# Patient Record
Sex: FEMALE | Race: WHITE | Employment: OTHER
[De-identification: names, ages, dates, MRNs, and addresses within clinical notes are randomized per-mention and may not be internally consistent; named-entity substitution may affect disease eponyms.]

---

## 2017-07-15 ENCOUNTER — HEALTH MAINTENANCE LETTER (OUTPATIENT)
Age: 52
End: 2017-07-15

## 2017-12-04 ENCOUNTER — CARE COORDINATION (OUTPATIENT)
Dept: CARE COORDINATION | Facility: CLINIC | Age: 52
End: 2017-12-04

## 2017-12-04 NOTE — PROGRESS NOTES
Clinic Care Coordination Contact  [facility]/Voicemail    Date: 12/4/17  Referral Source: PCP  Clinical Data: Care Coordinator Outreach due to recent PCP appointment. Patient is experiencing increased symptoms of anxiety due to multiple life stressors.   Outreach attempted x 1.  Left message on voicemail with call back information and requested return call.  Plan: Care Coordinator will try to reach patient again in 3-5 business days.    Evelina Gonzáles [facility]   Care Coordinator   [phone]

## 2018-05-01 ENCOUNTER — PATIENT OUTREACH (OUTPATIENT)
Dept: CARE COORDINATION | Facility: CLINIC | Age: 53
End: 2018-05-01

## 2018-05-08 ENCOUNTER — PATIENT OUTREACH (OUTPATIENT)
Dept: CARE COORDINATION | Facility: CLINIC | Age: 53
End: 2018-05-08

## 2018-05-08 DIAGNOSIS — Z65.9 PSYCHOSOCIAL PROBLEM: Primary | ICD-10-CM

## 2018-05-08 NOTE — LETTER
[place] CARE COORDINATION    May 8, 2018    Kiah CHOWDARY Scott [facility]  [address]  [place] [address]      Dear Kiah,    I am a clinic care coordinator who works with Tonia Lua at [facility]. I wanted to thank you for spending the time to talk with me.  I wanted to provide you with my contact information so that you can call me with questions or concerns about your health care. Below is a description of clinic care coordination and how I can further assist you.     The clinic care coordinator is a registered nurse and/or  who understand the health care system. The goal of clinic care coordination is to help you manage your health and improve access to the [facility] system in the most efficient manner. The registered nurse can assist you in meeting your health care goals by providing education, coordinating services, and strengthening the communication among your providers. The  can assist you with financial, behavioral, psychosocial, chemical dependency, counseling, and/or psychiatric resources.    Please feel free to contact me at [phone], with any questions or concerns. I am glad that you now have insurance and are getting the help you need.      Sincerely,     Evelina Gonzáles, [facility]  Social Work Care Coordinator   [facility]  [phone]

## 2018-05-08 NOTE — PROGRESS NOTES
"Clinic Care Coordination Contact  [facility] [[email or web address]Voicemail       Clinical Data: Care Coordinator Outreach - Patient has upcoming appointment on 5/18. In the past barriers to care was that patient didn't have insurance.   Outreach attempted x 1.  Left message on voicemail with call back information and requested return call.  Plan:  Care Coordinator will try to reach patient again in 3-5 business days.    Evelina Gonzáles [facility]  Social Work Care Coordinator   [facility]  [phone]      2:00pm - Patient returned SW voicemail. Patient states she is doing better. She states her son contacted the [facility] on her and so she is now involved with a [facility] worker and is on Medical Assistance for insurance. She has good family support but continues to struggle with accepting the help from the [facility]. She states it has been \"humiliating\" but at the same time \"I don't want to end up 52 living under a bridge somewhere.\" Patient denies any current needs as she is working with her [facility] worker. Patient is agreeable to  FU in a month to check in.    Evelina Gonzáles [facility]  Social Work Care Coordinator   [facility]  [phone]                "

## 2018-06-22 ENCOUNTER — PATIENT OUTREACH (OUTPATIENT)
Dept: CARE COORDINATION | Facility: CLINIC | Age: 53
End: 2018-06-22

## 2018-06-22 NOTE — PROGRESS NOTES
Clinic Care Coordination Contact  [facility]/Voicemail    Referral Source: PCP  Clinical Data: Care Coordinator Outreach - to FU from prior conversation. If no needs at this time will close patient to CC services. She is connected to the [facility] for services and has MA for insurance.   Outreach attempted x 1.  Left message on voicemail with call back information and requested return call.  Plan: Care Coordinator will try to reach patient again in 3-5 business days.    Evelina Gonzáles [facility]  Social Work Care Coordinator   [facility]  [phone]

## 2018-07-22 ENCOUNTER — HEALTH MAINTENANCE LETTER (OUTPATIENT)
Age: 53
End: 2018-07-22

## 2018-11-13 NOTE — PROGRESS NOTES
Clinic Care Coordination Contact    Situation: Patient chart reviewed by care coordinator.    Background: SW outreach is overdue. SW was going to close patient at last encounter as patient has MA and is connected to the [facility] for services.    Assessment: Patient did not return last outreach. SW did not locate any current needs on chart review for CC outreach.    Plan/Recommendations: SW will do no further outreach at this time.    Evelina Gonzáles [facility]  Social Work Care Coordinator   [facility]  [phone]

## 2019-07-25 ENCOUNTER — APPOINTMENT (OUTPATIENT)
Dept: CT IMAGING | Facility: CLINIC | Age: 54
End: 2019-07-25
Attending: EMERGENCY MEDICINE

## 2019-07-25 ENCOUNTER — HOSPITAL ENCOUNTER (EMERGENCY)
Facility: CLINIC | Age: 54
Discharge: HOME | End: 2019-07-25
Attending: EMERGENCY MEDICINE | Admitting: EMERGENCY MEDICINE

## 2019-07-25 VITALS
HEART RATE: 87 BPM | RESPIRATION RATE: 16 BRPM | TEMPERATURE: 97.9 F | SYSTOLIC BLOOD PRESSURE: 135 MMHG | OXYGEN SATURATION: 100 % | DIASTOLIC BLOOD PRESSURE: 80 MMHG

## 2019-07-25 DIAGNOSIS — R10.84 ABDOMINAL PAIN, GENERALIZED: ICD-10-CM

## 2019-07-25 LAB
ALBUMIN SERPL-MCNC: 4.1 G/DL (ref 3.4–5)
ALBUMIN UR-MCNC: NEGATIVE MG/DL
ALP SERPL-CCNC: 90 U/L (ref 40–150)
ALT SERPL W P-5'-P-CCNC: 22 U/L (ref 0–50)
ANION GAP SERPL CALCULATED.3IONS-SCNC: 6 MMOL/L (ref 3–14)
APPEARANCE UR: CLEAR
AST SERPL W P-5'-P-CCNC: 12 U/L (ref 0–45)
BASOPHILS # BLD AUTO: 0 10E9/L (ref 0–0.2)
BASOPHILS NFR BLD AUTO: 0.6 %
BILIRUB SERPL-MCNC: 0.4 MG/DL (ref 0.2–1.3)
BILIRUB UR QL STRIP: NEGATIVE
BUN SERPL-MCNC: 16 MG/DL (ref 7–30)
CALCIUM SERPL-MCNC: 9.1 MG/DL (ref 8.5–10.1)
CHLORIDE SERPL-SCNC: 113 MMOL/L (ref 94–109)
CO2 SERPL-SCNC: 23 MMOL/L (ref 20–32)
COLOR UR AUTO: ABNORMAL
CREAT SERPL-MCNC: 0.79 MG/DL (ref 0.52–1.04)
DIFFERENTIAL METHOD BLD: NORMAL
EOSINOPHIL # BLD AUTO: 0.1 10E9/L (ref 0–0.7)
EOSINOPHIL NFR BLD AUTO: 1 %
ERYTHROCYTE [DISTWIDTH] IN BLOOD BY AUTOMATED COUNT: 12.7 % (ref 10–15)
GFR SERPL CREATININE-BSD FRML MDRD: 85 ML/MIN/{1.73_M2}
GLUCOSE SERPL-MCNC: 117 MG/DL (ref 70–99)
GLUCOSE UR STRIP-MCNC: NEGATIVE MG/DL
HCG UR QL: NEGATIVE
HCT VFR BLD AUTO: 43.2 % (ref 35–47)
HGB BLD-MCNC: 15.3 G/DL (ref 11.7–15.7)
HGB UR QL STRIP: ABNORMAL
IMM GRANULOCYTES # BLD: 0 10E9/L (ref 0–0.4)
IMM GRANULOCYTES NFR BLD: 0.1 %
KETONES UR STRIP-MCNC: NEGATIVE MG/DL
LEUKOCYTE ESTERASE UR QL STRIP: NEGATIVE
LYMPHOCYTES # BLD AUTO: 1.4 10E9/L (ref 0.8–5.3)
LYMPHOCYTES NFR BLD AUTO: 20 %
MCH RBC QN AUTO: 30.9 PG (ref 26.5–33)
MCHC RBC AUTO-ENTMCNC: 35.4 G/DL (ref 31.5–36.5)
MCV RBC AUTO: 87 FL (ref 78–100)
MONOCYTES # BLD AUTO: 0.4 10E9/L (ref 0–1.3)
MONOCYTES NFR BLD AUTO: 5.5 %
NEUTROPHILS # BLD AUTO: 5.2 10E9/L (ref 1.6–8.3)
NEUTROPHILS NFR BLD AUTO: 72.8 %
NITRATE UR QL: NEGATIVE
NRBC # BLD AUTO: 0 10*3/UL
NRBC BLD AUTO-RTO: 0 /100
PH UR STRIP: 5.5 PH (ref 5–7)
PLATELET # BLD AUTO: 245 10E9/L (ref 150–450)
POTASSIUM SERPL-SCNC: 3.6 MMOL/L (ref 3.4–5.3)
PROT SERPL-MCNC: 7.8 G/DL (ref 6.8–8.8)
RBC # BLD AUTO: 4.95 10E12/L (ref 3.8–5.2)
RBC #/AREA URNS AUTO: 1 /HPF (ref 0–2)
SODIUM SERPL-SCNC: 142 MMOL/L (ref 133–144)
SOURCE: ABNORMAL
SP GR UR STRIP: 1 (ref 1–1.03)
SQUAMOUS #/AREA URNS AUTO: <1 /HPF (ref 0–1)
UROBILINOGEN UR STRIP-MCNC: NORMAL MG/DL (ref 0–2)
WBC # BLD AUTO: 7.1 10E9/L (ref 4–11)
WBC #/AREA URNS AUTO: 0 /HPF (ref 0–5)

## 2019-07-25 PROCEDURE — 81001 URINALYSIS AUTO W/SCOPE: CPT | Performed by: EMERGENCY MEDICINE

## 2019-07-25 PROCEDURE — 85025 COMPLETE CBC W/AUTO DIFF WBC: CPT | Performed by: EMERGENCY MEDICINE

## 2019-07-25 PROCEDURE — 25000125 ZZHC RX 250: Performed by: EMERGENCY MEDICINE

## 2019-07-25 PROCEDURE — 99285 EMERGENCY DEPT VISIT HI MDM: CPT | Mod: 25

## 2019-07-25 PROCEDURE — 80053 COMPREHEN METABOLIC PANEL: CPT | Performed by: EMERGENCY MEDICINE

## 2019-07-25 PROCEDURE — 81025 URINE PREGNANCY TEST: CPT | Performed by: EMERGENCY MEDICINE

## 2019-07-25 PROCEDURE — 74177 CT ABD & PELVIS W/CONTRAST: CPT

## 2019-07-25 PROCEDURE — 25500064 ZZH RX 255 OP 636: Performed by: EMERGENCY MEDICINE

## 2019-07-25 RX ADMIN — SODIUM CHLORIDE 70 ML: 9 INJECTION, SOLUTION INTRAVENOUS at 08:09

## 2019-07-25 RX ADMIN — IOHEXOL 98 ML: 350 INJECTION, SOLUTION INTRAVENOUS at 08:09

## 2019-07-25 ASSESSMENT — ENCOUNTER SYMPTOMS
CHILLS: 1
ABDOMINAL PAIN: 1
COUGH: 0
DYSURIA: 0
DIAPHORESIS: 1
UNEXPECTED WEIGHT CHANGE: 1
ABDOMINAL DISTENTION: 1
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
## 2019-07-25 NOTE — ED AVS SNAPSHOT
Emergency Department  [address] [address]  Phone:  [phone]  Fax:  [phone]                                    Kiah Scott   MRN: [id number]    Department:   Emergency Department   Date of Visit:  7/25/2019           After Visit Summary Signature Page    I have received my discharge instructions, and my questions have been answered. I have discussed any challenges I see with this plan with the nurse or doctor.    ..........................................................................................................................................  Patient/Patient Representative Signature      ..........................................................................................................................................  Patient Representative Print Name and Relationship to Patient    ..................................................               ................................................  Date                                   Time    ..........................................................................................................................................  Reviewed by Signature/Title    ...................................................              ..............................................  Date                                               Time          22EPIC Rev 08/18
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
## 2019-11-03 ENCOUNTER — HEALTH MAINTENANCE LETTER (OUTPATIENT)
Age: 54
End: 2019-11-03

## 2019-11-27 ENCOUNTER — PATIENT OUTREACH (OUTPATIENT)
Dept: CARE COORDINATION | Facility: CLINIC | Age: 54
End: 2019-11-27

## 2019-11-27 DIAGNOSIS — G35 MS (MULTIPLE SCLEROSIS) (H): Primary | ICD-10-CM

## 2019-11-27 NOTE — PROGRESS NOTES
Contact  [facility]    Referral Source: Care Team  Clinical Data:  Outreach  Outreach attempted x 1.  Left message on voicemail with call back information and requested return call.  Plan:. will try to reach patient again in 1-2 business days.  Social Mervin Bermudez  [facility]  [phone]      Clinic Care Coordination Contact  [facility]/Voicemail       Clinical Data: Care Coordinator Outreach  Outreach attempted x 2.  Left message on patient's voicemail with call back information and requested return call.  Plan: Care Coordinator will send care coordination introduction letter with care coordinator contact information and explanation of care coordination services via mail. Care Coordinator will do no further outreaches at this time.  Social Mervin eBrmudez  [facility]  [phone]    Clinic Care Coordination Contact  [facility]       Clinical Data: Care Coordinator Outreach    Plan: Care Coordinator will send care coordination introduction letter with care coordinator contact information and explanation of care coordination services via mail. Care Coordinator will do no further outreaches at this time.  Social Mervin Bermudez  [facility]  [phone]

## 2020-06-05 ENCOUNTER — APPOINTMENT (OUTPATIENT)
Dept: CT IMAGING | Facility: CLINIC | Age: 55
End: 2020-06-05
Attending: EMERGENCY MEDICINE
Payer: MEDICARE

## 2020-06-05 ENCOUNTER — HOSPITAL ENCOUNTER (EMERGENCY)
Facility: CLINIC | Age: 55
Discharge: HOME | End: 2020-06-05
Attending: EMERGENCY MEDICINE | Admitting: EMERGENCY MEDICINE
Payer: MEDICARE

## 2020-06-05 VITALS
OXYGEN SATURATION: 98 % | DIASTOLIC BLOOD PRESSURE: 98 MMHG | TEMPERATURE: 98.3 F | SYSTOLIC BLOOD PRESSURE: 142 MMHG | HEART RATE: 88 BPM

## 2020-06-05 DIAGNOSIS — R10.9 ABDOMINAL PAIN OF UNKNOWN CAUSE: ICD-10-CM

## 2020-06-05 DIAGNOSIS — R06.09 DYSPNEA ON EXERTION: ICD-10-CM

## 2020-06-05 DIAGNOSIS — L08.0 PUSTULAR RASH: ICD-10-CM

## 2020-06-05 DIAGNOSIS — R07.9 CHEST PAIN, UNSPECIFIED TYPE: ICD-10-CM

## 2020-06-05 LAB
ALBUMIN SERPL-MCNC: 3.9 G/DL (ref 3.4–5)
ALBUMIN UR-MCNC: NEGATIVE MG/DL
ALP SERPL-CCNC: 105 U/L (ref 40–150)
ALT SERPL W P-5'-P-CCNC: 52 U/L (ref 0–50)
ANION GAP SERPL CALCULATED.3IONS-SCNC: 6 MMOL/L (ref 3–14)
APPEARANCE UR: CLEAR
AST SERPL W P-5'-P-CCNC: 32 U/L (ref 0–45)
B BURGDOR IGG+IGM SER QL: 0.12 (ref 0–0.89)
BACTERIA #/AREA URNS HPF: ABNORMAL /HPF
BASOPHILS # BLD AUTO: 0.1 10E9/L (ref 0–0.2)
BASOPHILS NFR BLD AUTO: 0.8 %
BILIRUB SERPL-MCNC: 0.7 MG/DL (ref 0.2–1.3)
BILIRUB UR QL STRIP: NEGATIVE
BUN SERPL-MCNC: 11 MG/DL (ref 7–30)
CALCIUM SERPL-MCNC: 9.1 MG/DL (ref 8.5–10.1)
CHLORIDE SERPL-SCNC: 108 MMOL/L (ref 94–109)
CO2 SERPL-SCNC: 25 MMOL/L (ref 20–32)
COLOR UR AUTO: YELLOW
CREAT SERPL-MCNC: 0.82 MG/DL (ref 0.52–1.04)
D DIMER PPP FEU-MCNC: 0.5 UG/ML FEU (ref 0–0.5)
DIFFERENTIAL METHOD BLD: ABNORMAL
EOSINOPHIL # BLD AUTO: 0.2 10E9/L (ref 0–0.7)
EOSINOPHIL NFR BLD AUTO: 2.6 %
ERYTHROCYTE [DISTWIDTH] IN BLOOD BY AUTOMATED COUNT: 13.1 % (ref 10–15)
GFR SERPL CREATININE-BSD FRML MDRD: 80 ML/MIN/{1.73_M2}
GLUCOSE SERPL-MCNC: 105 MG/DL (ref 70–99)
GLUCOSE UR STRIP-MCNC: NEGATIVE MG/DL
HCT VFR BLD AUTO: 49.1 % (ref 35–47)
HGB BLD-MCNC: 15.7 G/DL (ref 11.7–15.7)
HGB UR QL STRIP: ABNORMAL
IMM GRANULOCYTES # BLD: 0 10E9/L (ref 0–0.4)
IMM GRANULOCYTES NFR BLD: 0.4 %
INTERPRETATION ECG - MUSE: NORMAL
KETONES UR STRIP-MCNC: NEGATIVE MG/DL
LEUKOCYTE ESTERASE UR QL STRIP: NEGATIVE
LIPASE SERPL-CCNC: 172 U/L (ref 73–393)
LYMPHOCYTES # BLD AUTO: 1.8 10E9/L (ref 0.8–5.3)
LYMPHOCYTES NFR BLD AUTO: 23.6 %
MCH RBC QN AUTO: 29.6 PG (ref 26.5–33)
MCHC RBC AUTO-ENTMCNC: 32 G/DL (ref 31.5–36.5)
MCV RBC AUTO: 93 FL (ref 78–100)
MONOCYTES # BLD AUTO: 0.6 10E9/L (ref 0–1.3)
MONOCYTES NFR BLD AUTO: 7.8 %
MUCOUS THREADS #/AREA URNS LPF: PRESENT /LPF
NEUTROPHILS # BLD AUTO: 5.1 10E9/L (ref 1.6–8.3)
NEUTROPHILS NFR BLD AUTO: 64.8 %
NITRATE UR QL: NEGATIVE
NRBC # BLD AUTO: 0 10*3/UL
NRBC BLD AUTO-RTO: 0 /100
PH UR STRIP: 5.5 PH (ref 5–7)
PLATELET # BLD AUTO: 274 10E9/L (ref 150–450)
POTASSIUM SERPL-SCNC: 4 MMOL/L (ref 3.4–5.3)
PROT SERPL-MCNC: 8.3 G/DL (ref 6.8–8.8)
RBC # BLD AUTO: 5.3 10E12/L (ref 3.8–5.2)
RBC #/AREA URNS AUTO: 8 /HPF (ref 0–2)
SODIUM SERPL-SCNC: 139 MMOL/L (ref 133–144)
SOURCE: ABNORMAL
SP GR UR STRIP: 1.02 (ref 1–1.03)
SQUAMOUS #/AREA URNS AUTO: 4 /HPF (ref 0–1)
T4 FREE SERPL-MCNC: 0.91 NG/DL (ref 0.76–1.46)
TROPONIN I SERPL-MCNC: <0.015 UG/L (ref 0–0.04)
TSH SERPL DL<=0.005 MIU/L-ACNC: 8.29 MU/L (ref 0.4–4)
UROBILINOGEN UR STRIP-MCNC: NORMAL MG/DL (ref 0–2)
WBC # BLD AUTO: 7.8 10E9/L (ref 4–11)
WBC #/AREA URNS AUTO: <1 /HPF (ref 0–5)

## 2020-06-05 PROCEDURE — 84443 ASSAY THYROID STIM HORMONE: CPT | Performed by: EMERGENCY MEDICINE

## 2020-06-05 PROCEDURE — 25000128 H RX IP 250 OP 636: Performed by: EMERGENCY MEDICINE

## 2020-06-05 PROCEDURE — 99285 EMERGENCY DEPT VISIT HI MDM: CPT | Mod: 25

## 2020-06-05 PROCEDURE — 25000125 ZZHC RX 250: Performed by: EMERGENCY MEDICINE

## 2020-06-05 PROCEDURE — 71275 CT ANGIOGRAPHY CHEST: CPT

## 2020-06-05 PROCEDURE — 85025 COMPLETE CBC W/AUTO DIFF WBC: CPT | Performed by: EMERGENCY MEDICINE

## 2020-06-05 PROCEDURE — 90791 PSYCH DIAGNOSTIC EVALUATION: CPT

## 2020-06-05 PROCEDURE — 96360 HYDRATION IV INFUSION INIT: CPT | Mod: 59

## 2020-06-05 PROCEDURE — 93005 ELECTROCARDIOGRAM TRACING: CPT

## 2020-06-05 PROCEDURE — 25800030 ZZH RX IP 258 OP 636: Performed by: EMERGENCY MEDICINE

## 2020-06-05 PROCEDURE — 85379 FIBRIN DEGRADATION QUANT: CPT | Performed by: EMERGENCY MEDICINE

## 2020-06-05 PROCEDURE — 83690 ASSAY OF LIPASE: CPT | Performed by: EMERGENCY MEDICINE

## 2020-06-05 PROCEDURE — 84439 ASSAY OF FREE THYROXINE: CPT | Performed by: EMERGENCY MEDICINE

## 2020-06-05 PROCEDURE — 81001 URINALYSIS AUTO W/SCOPE: CPT | Performed by: EMERGENCY MEDICINE

## 2020-06-05 PROCEDURE — 96361 HYDRATE IV INFUSION ADD-ON: CPT

## 2020-06-05 PROCEDURE — 84484 ASSAY OF TROPONIN QUANT: CPT | Performed by: EMERGENCY MEDICINE

## 2020-06-05 PROCEDURE — 80053 COMPREHEN METABOLIC PANEL: CPT | Performed by: EMERGENCY MEDICINE

## 2020-06-05 PROCEDURE — 86618 LYME DISEASE ANTIBODY: CPT | Performed by: EMERGENCY MEDICINE

## 2020-06-05 RX ORDER — SODIUM CHLORIDE 9 MG/ML
1000 INJECTION, SOLUTION INTRAVENOUS CONTINUOUS
Status: DISCONTINUED | OUTPATIENT
Start: 2020-06-05 | End: 2020-06-05 | Stop reason: HOSPADM

## 2020-06-05 RX ORDER — MUPIROCIN 20 MG/G
OINTMENT TOPICAL 3 TIMES DAILY
Qty: 1 G | Refills: 0 | Status: SHIPPED | OUTPATIENT
Start: 2020-06-05

## 2020-06-05 RX ORDER — IOPAMIDOL 755 MG/ML
500 INJECTION, SOLUTION INTRAVASCULAR ONCE
Status: COMPLETED | OUTPATIENT
Start: 2020-06-05 | End: 2020-06-05

## 2020-06-05 RX ORDER — SUMATRIPTAN 100 MG/1
50 TABLET, FILM COATED ORAL
COMMUNITY

## 2020-06-05 RX ADMIN — SODIUM CHLORIDE 85 ML: 9 INJECTION, SOLUTION INTRAVENOUS at 08:29

## 2020-06-05 RX ADMIN — IOPAMIDOL 90 ML: 755 INJECTION, SOLUTION INTRAVENOUS at 08:28

## 2020-06-05 RX ADMIN — SODIUM CHLORIDE 1000 ML: 9 INJECTION, SOLUTION INTRAVENOUS at 07:29

## 2020-06-05 ASSESSMENT — ENCOUNTER SYMPTOMS
HEMATURIA: 0
FEVER: 0
DIZZINESS: 1
ABDOMINAL PAIN: 1
FREQUENCY: 0
BLOOD IN STOOL: 0
DYSURIA: 0
DIARRHEA: 0
HALLUCINATIONS: 0
VOMITING: 0
UNEXPECTED WEIGHT CHANGE: 1
CHEST TIGHTNESS: 1
CHILLS: 1
MYALGIAS: 0
CONSTIPATION: 0
FATIGUE: 1
SHORTNESS OF BREATH: 1
NAUSEA: 1
HEADACHES: 1
DIFFICULTY URINATING: 0
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
## 2020-06-05 NOTE — ED AVS SNAPSHOT
[facility] Emergency Department  [address]  [place] [address]  Phone:  [phone]  Fax:  [phone]                                    Kiah Scott   MRN: [id number]    Department:  [facility] Emergency Department   Date of Visit:  6/5/2020           After Visit Summary Signature Page    I have received my discharge instructions, and my questions have been answered. I have discussed any challenges I see with this plan with the nurse or doctor.    ..........................................................................................................................................  Patient/Patient Representative Signature      ..........................................................................................................................................  Patient Representative Print Name and Relationship to Patient    ..................................................               ................................................  Date                                   Time    ..........................................................................................................................................  Reviewed by Signature/Title    ...................................................              ..............................................  Date                                               Time          22EPIC Rev 08/18
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
## 2020-06-05 NOTE — ED PROVIDER NOTES
"  History     Chief Complaint:  Chest pain, abdominal pain, rash       HPI   Kiah Scott is a 54 year old female who presents to the emergency department for evaluation of chest pain, abdominal pain and rash.  She notes that the symptoms she presents with today have been present for years.  She feels as though she has had tests in the past and \"someone's not telling me something\" about what is wrong as she knows something is wrong.  She notes that she has struggled for years with intermittent chest pain, intermittent abdominal pain and frequent headaches.  She has a relapsing/remitting form of MS has not seen a neurologist for quite some time.  She notes the chest pain and abdominal pain is been more intense over last couple of days and she felt not well yesterday and \"should have come in\" but decided to wait till today.  Describes the chest pain as a tightness that is substernal.  No radiation.  She notes intermittent dizziness when she exerts herself but not clearly associated the chest pain.  She notes occasional shortness of breath and more fatigued than she would expect when she exerts herself.  Regards to her abdominal pain she notes she feels a \"fluttering\" or \"heartbeat\" in her left lower abdomen and feels that someone she implanted something inside of her body without her knowledge.  Is been going on for years.  She also has diffuse upper abdominal pain.  Has occasional nausea but no vomiting.  No clear association with food.  She notes she has a history of constipation and increases fiber in her diet for that.  She denies any recent stool changes.  She denies any dysuria hematuria or urine changes.  Denies any vaginal symptoms.  She has had hot and cold flashes but no known fevers.  No body aches aside from pain in her legs when she walks at times..  She notes she has a without clear cause gained over 40 pounds in the past years.  She is currently going to move from her home feels as though " someone might be coming into it.  She lives her mother and feels safe there.  She denies verbal or physical abuse.  She occasionally uses alcohol but rarely.  She denies recreational drugs.  She has stopped taking her medications and is only taking many different vitamins now as she feels distrustful of doctors and medicine.  Notes a rash she noted 2 days ago over her right posterior thigh.  She had a similar rash over her right flank a while back but that went away on its own.  She notes it is mildly tender but she has been unable to see it.  She notes she has trees in her yard but does not have any known tick exposures or [place]  She notes she has chronic headaches that seem to localize to the right frontal region.  But does seem to intensify over the right.  She has had brain imaging in the past for this.  Denies hallucinations or feeling suicidal or homicidal.  She however feels as though she would benefit from seeing a therapist.  She used to see a therapist in the past but is no longer.    Allergies:  No Known Drug Allergies     Medications:    No currently taking any prescribed medications  Vitamins and supplements    Past Medical History:    Hematuria  MS  Migraine  Sinusitis    Past Surgical History:      Sinus procedure  Tummy tuck    Family History:    Hypertension  Lipids  Heart disease  COPD  Lung cancer    Social History:  The patient presents today alone.  Smoking Status: Former smoker  Smokeless Tobacco: Never Used  Alcohol Use: Yes  Drug Use: No  PCP: Tonia Lua      Review of Systems   Constitutional: Positive for chills, fatigue and unexpected weight change. Negative for fever.   Respiratory: Positive for chest tightness and shortness of breath.    Cardiovascular: Positive for chest pain.   Gastrointestinal: Positive for abdominal pain and nausea. Negative for blood in stool, constipation, diarrhea and vomiting.   Genitourinary: Negative for difficulty urinating,  dysuria, frequency, hematuria, urgency, vaginal bleeding, vaginal discharge and vaginal pain.   Musculoskeletal: Negative for myalgias.   Skin: Positive for rash.   Neurological: Positive for dizziness and headaches.   Psychiatric/Behavioral: Negative for hallucinations and suicidal ideas.        Denies homicidal ideas   All other systems reviewed and are negative.      Physical Exam     Patient Vitals for the past 24 hrs:   BP Temp Temp src Heart Rate SpO2   06/05/20 0648 (!) 138/104 98.3  F (36.8  C) Oral 91 97 %        Physical Exam  General: Adult female sitting upright  Eyes: PERRL, Conjunctive within normal limits. No scleral icterus.  ENT: Moist mucous membranes, oropharynx clear.   Neck: No thyromegaly. No rigidity.  CV: Normal S1S2, no murmur, rub or gallop. Regular rate and rhythm  Resp: Clear to auscultation bilaterally, no wheezes, rales or rhonchi. Normal respiratory effort.  GI: Abdomen is soft and nondistended. LLQ and suprapubic tenderness to palpation with voluntary guarding. No palpable masses. No rebound.  MSK: No edema. Nontender. Normal active range of motion.  Skin: Warm and dry. 3-4cm indurated erythematous area over right posterolateral thigh, mildly tender. No flunctuance. Multiple pustular papules in center. No other rashes or lesions or ecchymoses on visible skin.  Neuro: Alert and oriented. Responds appropriately to all questions and commands. No focal findings appreciated. Normal muscle tone.  Psych: Mildly anxious when discussing history. Otherwise normal mood and affect.    Emergency Department Course     ECG:  Time: 0713  Vent. Rate 83 bpm. DC interval 176. QRS duration 110. QT/QTc 400/470. P-R-T axis 61 -20 56.  Sinus rhythm  Cannot rule out inferior infarct, age undetermined  Abnormal ECG  Read time: 0714    Imaging:  Radiology findings were communicated with the patient who voiced understanding of the findings.    CT Chest (PE) Abdomen Pelvis w Contrast:  1. No evidence of  pulmonary embolism.  2. No acute pathology in the chest, abdomen or pelvis.  3. The main pulmonary artery is mildly enlarged measuring 3.1 cm, this  can be seen with pulmonary hypertension.  4. Mild bibasilar predominant mosaic attenuation, can be seen with  chronic small vessel or chronic small airway disease.  5. Colonic diverticulosis without CT evidence of acute diverticulitis.  As per radiology.    Laboratory:  Laboratory findings were communicated with the patient who voiced understanding of the findings.    CBC: WNL. (WBC 7.8, HGB 15.7, )   CMP: Glucose 105 (H), ALT 52 (H) o/w WNL (Creatinine 0.82)    Lipase 172    TSH with free T4 reflex 8.29 (H)    Troponin I <0.015    D-dimer quantitative 0.5    T4 free 0.91    Lyme disease Darcy with reflex to WB serum In process    UA with micro: Blood Small (A), RBC/HPF 8 (H), Bacteria Few (A), Squamous Epithelial/HPF 4 (H), Mucous Present (A) o/w negative      Interventions:  0729 NS 1L IV     Emergency Department Course:    0653 nursing notes and vitals reviewed.    0657 I performed an exam of the patient as documented above.     0701 IV was inserted and blood was drawn for laboratory testing, results above.     0713 EKG obtained as noted above.     0741 The patient provided a urine sample here in the emergency department. This was sent for laboratory testing, findings above.    0824 The patient was sent for a CT Chest(PE) Abdomen Pelvis w Contrast while in the emergency department, results above.      Findings and plan explained to the Patient. Patient discharged home with instructions regarding supportive care, medications, and reasons to return. The importance of close follow-up was reviewed. The patient was prescribed Bactroban.    Impression & Plan     Medical Decision Making:  Kiah Scott is a 54 year old female who presents to the emergency department today with chest pain, abdominal pain and right thigh rash.  Her chest pain and abdominal pain  have bothered her for years.  They do seem to be intensified over the last 2 days.  She also struggles with chronic intermittent headaches of which she notes that she has had over the past 2 days as well.  Rash is new for her first noticed a couple of days ago as well.  He has no fever and is stable from a hemodynamic standpoint.  Multiple etiologies were considered for chest pain abdominal pain however as they have been somewhat chronic in nature acute surgical process seems less likely.  Because of the change in intensity imaging evaluation was undertaken.  Fortunately this did not show any signs of acute surgical or emergent life-threatening pathology.  EKG and troponin were normal in the setting of constant pain her symptoms seem unlikely to represent ACS.  D dimer was just at the range at which CT would be indicated.  Fortunately there is no evidence of pulmonary embolism or other acute chest pathology.  The rash is a pustular rash with no evidence of abscess or spreading cellulitis.  A trial of mupirocin seem indicated.  She is aware that she may need to be reassessed if does not improve or worsens.  No negation for systemic antibiotics at this time.  She was evaluated by [facility] given her paranoia, anxiety and depression.  She is not suicidal or homicidal.  They were able to set her up with outpatient therapy and psychiatry care.  She will be scheduled for outpatient stress test especially given her exertional shortness of breath and fatigue.  She is safe however for discharge home at this time recommended follow-up with primary care medicine and return here with worsening.  All questions answered prior to discharge.    Discharge Diagnosis:    ICD-10-CM    1. Chest pain, unspecified type  R07.9 Echo Stress Echocardiogram   2. Abdominal pain of unknown cause  R10.9    3. Dyspnea on exertion  R06.09 Echo Stress Echocardiogram   4. Pustular rash  L08.0      Disposition:  The patient is discharged to home.       Discharge Medications:  New Prescriptions    MUPIROCIN (BACTROBAN) 2 % EXTERNAL OINTMENT    Apply topically 3 times daily     Scribe Disclosure:  I, Esequiel Miramontes, am serving as a scribe at 6:56 AM on 6/5/2020 to document services personally performed by Neva Rainey MD based on my observations and the provider's statements to me.      Neva Rainey MD  06/05/20 3745

## 2020-06-05 NOTE — DISCHARGE INSTRUCTIONS
Discharge Instructions  Abdominal Pain    Abdominal pain (belly pain) can be caused by many things. Your evaluation today does not show the exact cause for your pain. Your provider today has decided that it is unlikely your pain is due to a life threatening problem, or a problem requiring surgery or hospital admission. Sometimes those problems cannot be found right away, so it is very important that you follow up as directed.  Sometimes only the changes which occur over time allow the cause of your pain to be found.    Generally, every Emergency Department visit should have a follow-up clinic visit with either a primary or a specialty clinic/provider. Please follow-up as instructed by your emergency provider today. With abdominal pain, we often recommend very close follow-up, such as the following day.    ADULTS:  Return to the Emergency Department right away if:    You get an oral temperature above 102oF or as directed by your provider.  You have blood in your stools. This may be bright red or appear as black, tarry stools.    You keep vomiting (throwing up) or cannot drink liquids.  You see blood when you vomit.   You cannot have a bowel movement or you cannot pass gas.  Your stomach gets bloated or bigger.  Your skin or the whites of your eyes look yellow.  You faint.  You have bloody, frequent or painful urination (peeing).  You have new symptoms or anything that worries you.    CHILDREN:  Return to the Emergency Department right away if your child has any of the above-listed symptoms or the following:    Pushes your hand away or screams/cries when his/her belly is touched.  You notice your child is very fussy or weak.  Your child is very tired and is too tired to eat or drink.  Your child is dehydrated.  Signs of dehydration can be:  Significant change in the amount of wet diapers/urine.  Your infant or child starts to have dry mouth and lips, or no saliva (spit) or tears.    PREGNANT WOMEN:  Return to the  Emergency Department right away if you have any of the above-listed symptoms or the following:    You have bleeding, leaking fluid or passing tissue from the vagina.  You have worse pain or cramping, or pain in your shoulder or back.  You have vomiting that will not stop.  You have a temperature of 100oF or more.  Your baby is not moving as much as usual.  You faint.  You get a bad headache with or without eye problems and abdominal pain.  You have a seizure.  You have unusual discharge from your vagina and abdominal pain.    Abdominal pain is pretty common during pregnancy.  Your pain may or may not be related to your pregnancy. You should follow-up closely with your OB provider so they can evaluate you and your baby.  Until you follow-up with your regular provider, do the following:     Avoid sex and do not put anything in your vagina.  Drink clear fluids.  Only take medications approved by your provider.    MORE INFORMATION:    Appendicitis:  A possible cause of abdominal pain in any person who still has their appendix is acute appendicitis. Appendicitis is often hard to diagnose.  Testing does not always rule out early appendicitis or other causes of abdominal pain. Close follow-up with your provider and re-evaluations may be needed to figure out the reason for your abdominal pain.    Follow-up:  It is very important that you make an appointment with your clinic and go to the appointment.  If you do not follow-up with your primary provider, it may result in missing an important development which could result in permanent injury or disability and/or lasting pain.  If there is any problem keeping your appointment, call your provider or return to the Emergency Department.    Medications:  Take your medications as directed by your provider today.  Before using over-the-counter medications, ask your provider and make sure to take the medications as directed.  If you have any questions about medications, ask your  "provider.    Diet:  Resume your normal diet as much as possible, but do not eat fried, fatty or spicy foods while you have pain.  Do not drink alcohol or have caffeine.  Do not smoke tobacco.    Probiotics: If you have been given an antibiotic, you may want to also take a probiotic pill or eat yogurt with live cultures. Probiotics have \"good bacteria\" to help your intestines stay healthy. Studies have shown that probiotics help prevent diarrhea (loose stools) and other intestine problems (including C. diff infection) when you take antibiotics. You can buy these without a prescription in the pharmacy section of the store.     If you were given a prescription for medicine here today, be sure to read all of the information (including the package insert) that comes with your prescription.  This will include important information about the medicine, its side effects, and any warnings that you need to know about.  The pharmacist who fills the prescription can provide more information and answer questions you may have about the medicine.  If you have questions or concerns that the pharmacist cannot address, please call or return to the Emergency Department.       Remember that you can always come back to the Emergency Department if you are not able to see your regular provider in the amount of time listed above, if you get any new symptoms, or if there is anything that worries you.     It is not clear what the rash on your thigh is due to. Apply mupirocin daily as prescribed. Be reassessed if worsening or not improving  "

## 2020-06-05 NOTE — ED TRIAGE NOTES
Pt presents with multiple complaints. Pt reports headache, rash, chest pain, cough and abd pain that started roughly yesterday. ABCs intact.

## 2020-06-05 NOTE — ED NOTES
"Pt crossed out \"call police if you feel unsafe\" and \"call suicide hotline\" for her safety plan. Pt states she does not feel safe calling the police. Also states \"I am not suicidal so I don't need to call this number\". Pt was told if she does start to feel suicidal they are there for her.   "

## 2020-06-10 ENCOUNTER — HOSPITAL ENCOUNTER (OUTPATIENT)
Dept: CARDIOLOGY | Facility: CLINIC | Age: 55
Discharge: HOME | End: 2020-06-10
Attending: EMERGENCY MEDICINE | Admitting: EMERGENCY MEDICINE
Payer: MEDICARE

## 2020-06-10 DIAGNOSIS — R06.09 DYSPNEA ON EXERTION: ICD-10-CM

## 2020-06-10 DIAGNOSIS — R07.9 CHEST PAIN, UNSPECIFIED TYPE: ICD-10-CM

## 2020-06-10 PROCEDURE — 93321 DOPPLER ECHO F-UP/LMTD STD: CPT | Mod: 26 | Performed by: INTERNAL MEDICINE

## 2020-06-10 PROCEDURE — 25500064 ZZH RX 255 OP 636: Performed by: EMERGENCY MEDICINE

## 2020-06-10 PROCEDURE — 93018 CV STRESS TEST I&R ONLY: CPT | Performed by: INTERNAL MEDICINE

## 2020-06-10 PROCEDURE — 93016 CV STRESS TEST SUPVJ ONLY: CPT | Performed by: INTERNAL MEDICINE

## 2020-06-10 PROCEDURE — 93325 DOPPLER ECHO COLOR FLOW MAPG: CPT | Mod: 26 | Performed by: INTERNAL MEDICINE

## 2020-06-10 PROCEDURE — 93350 STRESS TTE ONLY: CPT | Mod: 26 | Performed by: INTERNAL MEDICINE

## 2020-06-10 RX ADMIN — HUMAN ALBUMIN MICROSPHERES AND PERFLUTREN 4 ML: 10; .22 INJECTION, SOLUTION INTRAVENOUS at 11:38

## 2020-07-17 ENCOUNTER — OFFICE VISIT (OUTPATIENT)
Dept: URGENT CARE | Facility: URGENT CARE | Age: 55
End: 2020-07-17
Payer: MEDICARE

## 2020-07-17 VITALS
RESPIRATION RATE: 16 BRPM | TEMPERATURE: 98.4 F | OXYGEN SATURATION: 98 % | SYSTOLIC BLOOD PRESSURE: 132 MMHG | HEART RATE: 97 BPM | DIASTOLIC BLOOD PRESSURE: 78 MMHG

## 2020-07-17 DIAGNOSIS — H60.501 ACUTE OTITIS EXTERNA OF RIGHT EAR, UNSPECIFIED TYPE: Primary | ICD-10-CM

## 2020-07-17 DIAGNOSIS — J01.90 ACUTE SINUSITIS WITH COEXISTING CONDITION REQUIRING PROPHYLACTIC TREATMENT: ICD-10-CM

## 2020-07-17 PROCEDURE — 99203 OFFICE O/P NEW LOW 30 MIN: CPT | Performed by: FAMILY MEDICINE

## 2020-07-17 RX ORDER — MULTIVITAMIN WITH IRON
500 TABLET ORAL
COMMUNITY

## 2020-07-17 RX ORDER — ELETRIPTAN HYDROBROMIDE 40 MG/1
40 TABLET, FILM COATED ORAL
COMMUNITY

## 2020-07-17 RX ORDER — DOXYCYCLINE 100 MG/1
100 CAPSULE ORAL 2 TIMES DAILY
Qty: 20 CAPSULE | Refills: 0 | Status: SHIPPED | OUTPATIENT
Start: 2020-07-17 | End: 2020-07-27

## 2020-07-17 RX ORDER — NEOMYCIN SULFATE, POLYMYXIN B SULFATE, HYDROCORTISONE 3.5; 10000; 1 MG/ML; [USP'U]/ML; MG/ML
3 SOLUTION/ DROPS AURICULAR (OTIC) 4 TIMES DAILY
Qty: 10 ML | Refills: 0 | Status: SHIPPED | OUTPATIENT
Start: 2020-07-17 | End: 2020-07-24

## 2020-07-17 RX ORDER — TRAZODONE HYDROCHLORIDE 100 MG/1
100 TABLET ORAL
COMMUNITY

## 2020-07-17 RX ORDER — LISDEXAMFETAMINE DIMESYLATE 70 MG/1
70 CAPSULE ORAL
COMMUNITY

## 2020-07-17 RX ORDER — ACETAMINOPHEN 325 MG/1
325-650 TABLET ORAL
COMMUNITY

## 2020-07-17 RX ORDER — IBUPROFEN 200 MG
200-400 TABLET ORAL
COMMUNITY

## 2020-07-17 RX ORDER — LEVOTHYROXINE SODIUM 88 UG/1
88 TABLET ORAL
COMMUNITY

## 2020-07-17 RX ORDER — BUPROPION HYDROCHLORIDE 300 MG/1
300 TABLET ORAL
COMMUNITY

## 2020-07-17 RX ORDER — TOPIRAMATE 50 MG/1
50 TABLET, FILM COATED ORAL
COMMUNITY

## 2020-07-17 RX ORDER — VITAMIN B COMPLEX
TABLET ORAL
COMMUNITY

## 2020-07-17 RX ORDER — LIOTHYRONINE SODIUM 5 UG/1
5 TABLET ORAL
COMMUNITY

## 2020-07-17 RX ORDER — BIOTIN 1 MG
5000 TABLET ORAL
COMMUNITY

## 2020-07-17 RX ORDER — DOXYCYCLINE 100 MG/1
100 TABLET ORAL 2 TIMES DAILY
COMMUNITY
Start: 2019-11-26

## 2020-07-17 RX ORDER — MULTIPLE VITAMINS W/ MINERALS TAB 9MG-400MCG
1 TAB ORAL
COMMUNITY

## 2020-07-17 RX ORDER — TOPIRAMATE 25 MG/1
TABLET, FILM COATED ORAL
COMMUNITY
Start: 2019-10-23

## 2020-07-17 RX ORDER — LISDEXAMFETAMINE DIMESYLATE 50 MG/1
50 CAPSULE ORAL
COMMUNITY

## 2020-07-17 RX ORDER — POLYETHYLENE GLYCOL 3350 17 G/17G
POWDER, FOR SOLUTION ORAL
COMMUNITY

## 2020-07-17 RX ORDER — HYDROCORTISONE 5 MG/1
5 TABLET ORAL
COMMUNITY

## 2020-07-17 NOTE — PATIENT INSTRUCTIONS
Use ear drops in right ear 3-4 times a day for 7 days  Okay to take ibuprofen 200 mg - 4 tablets (800 mg) every 8 hours as needed.  Okay to take tylenol 500 mg - 2 tablets (1000 mg) every 6-8 hours as needed, do not exceed 3000 mg in 24 hours.    If sinus symptoms worsens in the next several days, then okay to start doxycycline for treatment      Patient Education     External Ear Infection (Adult)    External otitis (also called  swimmer s ear ) is an infection in the ear canal. It is often caused by bacteria or fungus. It can occur a few days after water gets trapped in the ear canal (from swimming or bathing). It can also occur after cleaning too deeply in the ear canal with a cotton swab or other object. Sometimes, hair care products get into the ear canal and cause this problem.  Symptoms can include pain, fever, itching, redness, drainage, or swelling of the ear canal. Temporary hearing loss may also occur.  Home care    Do not try to clean the ear canal. This can push pus and bacteria deeper into the canal.    Use prescribed ear drops as directed. These help reduce swelling and fight the infection. If an ear wick was placed in the ear canal, apply drops right onto the end of the wick. The wick will draw the medicine into the ear canal even if it is swollen closed.    A cotton ball may be loosely placed in the outer ear to absorb any drainage.    You may use acetaminophen or ibuprofen to control pain, unless another medicine was prescribed. Note: If you have chronic liver or kidney disease or ever had a stomach ulcer or GI bleeding, talk to your healthcare provider before taking any of these medicines.    Do not allow water to get into your ear when bathing. Also, don't swim until the infection has cleared.  Prevention    Keep your ears dry. This helps lower the risk of infection. Dry your ears with a towel or hair dryer after getting wet. Also, use ear plugs when swimming.    Do not stick any objects in the  ear to remove wax.    If you feel water trapped in your ear, use ear drops right away. You can get these drops over the counter at most drugstores. They work by removing water from the ear canal.  Follow-up care  Follow up with your healthcare provider in 1 week, or as advised.  When to seek medical advice  Call your healthcare provider right away if any of these occur:    Ear pain becomes worse or doesn t improve after 3 days of treatment    Redness or swelling of the outer ear occurs or gets worse    Headache    Painful or stiff neck    Drowsiness or confusion    Fever of 100.4 F (38 C) or higher, or as directed by your healthcare provider    Seizure  Date Last Reviewed: 10/1/2017    8343-0815 [employer]. [address] [place] [address], [place] [address]. All rights reserved. This information is not intended as a substitute for professional medical care. Always follow your healthcare professional's instructions.

## 2020-07-17 NOTE — PROGRESS NOTES
SUBJECTIVE:   Kiah Scott is a 54 year old female presenting with a chief complaint of right ear pain, headache, jaw pain.  Onset of symptoms was 3 day(s) ago.  Course of illness is waxing and waning.    Severity moderate  Current and Associated symptoms: ear pain  Treatment measures tried include Fluids, Rest and Ibuprofen.  Predisposing factors include HX of chronic sinusitis.    Seen by Neurology for headache.  Has mild sinus tenderness, used to have a lot of sinus infection.  Noticed more jaw tenderness.      Denies any fever.  Will get hot once and awhile, may be hot flashes.    Past Medical History:   Diagnosis Date     Hematuria     Negative cysto x2     MS (multiple sclerosis) (H)      Other forms of migraine, without mention of intractable migraine without mention of status migrainosus      Sinusitis      Current Outpatient Medications   Medication Sig Dispense Refill     acetaminophen (TYLENOL) 325 MG tablet Take 325-650 mg by mouth       biotin 1000 MCG TABS tablet 5,000 mcg       magnesium (SM MAGNESIUM) 250 MG tablet Take 500 mg by mouth       multivitamin w/minerals (MULTI-VITAMIN) tablet Take 1 tablet by mouth       polyethylene glycol (MIRALAX) 17 g packet        SUMAtriptan (IMITREX) 100 MG tablet Take 50 mg by mouth at onset of headache for migraine       topiramate (TOPAMAX) 25 MG tablet TAKE 1 TABLET BY MOUTH AT BEDTIME FOR 7 DAYS THEN TAKE ONE TABLET TWICE DAILY FOR 1 WEEK THEN ONE TABLET BY MOUTH IN THE MORNING AND 2 TABLE       topiramate (TOPAMAX) 50 MG tablet Take 50 mg by mouth       Vitamin D3 (CHOLECALCIFEROL) 125 MCG (5000 UT) tablet Take 5,000 Units by mouth       Vitamin D3 (VITAMIN D3) 25 mcg (1000 units) tablet        buPROPion (WELLBUTRIN XL) 300 MG 24 hr tablet Take 300 mg by mouth       doxycycline monohydrate (ADOXA) 100 MG tablet Take 100 mg by mouth 2 times daily       eletriptan (RELPAX) 40 MG tablet Take 40 mg by mouth       hydrocortisone (CORTEF) 5 MG tablet Take 5  mg by mouth       ibuprofen (ADVIL/MOTRIN) 200 MG tablet Take 200-400 mg by mouth       levothyroxine (SYNTHROID/LEVOTHROID) 88 MCG tablet Take 88 mcg by mouth       liothyronine (CYTOMEL) 5 MCG tablet Take 5 mcg by mouth       lisdexamfetamine (VYVANSE) 50 MG capsule Take 50 mg by mouth       lisdexamfetamine (VYVANSE) 70 MG capsule Take 70 mg by mouth       mupirocin (BACTROBAN) 2 % external ointment Apply topically 3 times daily (Patient not taking: Reported on 2020) 1 g 0     traZODone (DESYREL) 100 MG tablet Take 100 mg by mouth       Social History     Tobacco Use     Smoking status: Former Smoker     Last attempt to quit: 10/21/2002     Years since quittin.7     Smokeless tobacco: Never Used     Tobacco comment: quit in    Substance Use Topics     Alcohol use: Yes     Comment: Occasional       ROS:  Review of systems negative except as stated above.    OBJECTIVE:  /78   Pulse 97   Temp 98.4  F (36.9  C) (Tympanic)   Resp 16   LMP 10/18/2006   SpO2 98%   GENERAL APPEARANCE: healthy, alert and no distress  EYES: EOMI,  PERRL, conjunctiva clear  HENT: ear canal and TM normal on left, right ear canal with mild cerumen and mild irritated ear canal, right TM normal.  PSYCH: mentation appears normal and affect normal/bright    ASSESSMENT/PLAN:  (H60.501) Acute otitis externa of right ear, unspecified type  (primary encounter diagnosis)  Plan: neomycin-polymyxin-hydrocortisone (CORTISPORIN)        [id number] otic solution            (J01.90) Acute sinusitis with coexisting condition requiring prophylactic treatment  Plan: doxycycline hyclate (VIBRAMYCIN) 100 MG capsule            Reassurance given, reviewed symptomatic treatment with tylenol, ibuprofen, plenty of fluids and rest.  RX cortisporin otic drops given for otitis externa.  Discussed sinus infection and would like to have patient monitor symptoms for now and if worsens over the next several days, then okay to take antibiotic - RX  doxycycline given    Follow up with primary provider if no improvement of symptoms in 1-2 weeks    Bne Sellers MD, MD  July 17, 2020 6:21 PM

## 2020-11-16 ENCOUNTER — HEALTH MAINTENANCE LETTER (OUTPATIENT)
Age: 55
End: 2020-11-16

## 2021-09-18 ENCOUNTER — HEALTH MAINTENANCE LETTER (OUTPATIENT)
Age: 56
End: 2021-09-18

## 2022-01-08 ENCOUNTER — HEALTH MAINTENANCE LETTER (OUTPATIENT)
Age: 57
End: 2022-01-08

## 2022-11-19 ENCOUNTER — HEALTH MAINTENANCE LETTER (OUTPATIENT)
Age: 57
End: 2022-11-19

## 2023-04-15 ENCOUNTER — HEALTH MAINTENANCE LETTER (OUTPATIENT)
Age: 58
End: 2023-04-15

## 2023-05-11 PROCEDURE — 88305 TISSUE EXAM BY PATHOLOGIST: CPT | Mod: TC,ORL | Performed by: INTERNAL MEDICINE

## 2023-05-12 ENCOUNTER — LAB REQUISITION (OUTPATIENT)
Dept: LAB | Facility: CLINIC | Age: 58
End: 2023-05-12
Payer: MEDICARE

## 2023-05-12 DIAGNOSIS — R10.13 EPIGASTRIC PAIN: ICD-10-CM

## 2023-05-12 DIAGNOSIS — R14.0 ABDOMINAL DISTENSION (GASEOUS): ICD-10-CM

## 2023-05-12 DIAGNOSIS — K29.70 GASTRITIS, UNSPECIFIED, WITHOUT BLEEDING: ICD-10-CM

## 2023-05-15 LAB
PATH REPORT.COMMENTS IMP SPEC: NORMAL
PATH REPORT.COMMENTS IMP SPEC: NORMAL
PATH REPORT.FINAL DX SPEC: NORMAL
PATH REPORT.GROSS SPEC: NORMAL
PATH REPORT.MICROSCOPIC SPEC OTHER STN: NORMAL
PATH REPORT.RELEVANT HX SPEC: NORMAL
PHOTO IMAGE: NORMAL

## 2023-05-15 PROCEDURE — 88305 TISSUE EXAM BY PATHOLOGIST: CPT | Mod: 26 | Performed by: PATHOLOGY
